# Patient Record
Sex: MALE | Race: WHITE | NOT HISPANIC OR LATINO | URBAN - METROPOLITAN AREA
[De-identification: names, ages, dates, MRNs, and addresses within clinical notes are randomized per-mention and may not be internally consistent; named-entity substitution may affect disease eponyms.]

---

## 2023-10-23 ENCOUNTER — CLAIMS CREATED FROM THE CLAIM WINDOW (OUTPATIENT)
Dept: URBAN - METROPOLITAN AREA CLINIC 98 | Facility: CLINIC | Age: 68
End: 2023-10-23
Payer: MEDICARE

## 2023-10-23 ENCOUNTER — DASHBOARD ENCOUNTERS (OUTPATIENT)
Age: 68
End: 2023-10-23

## 2023-10-23 VITALS
HEART RATE: 81 BPM | HEIGHT: 74 IN | DIASTOLIC BLOOD PRESSURE: 69 MMHG | TEMPERATURE: 98.1 F | BODY MASS INDEX: 40.43 KG/M2 | SYSTOLIC BLOOD PRESSURE: 117 MMHG | WEIGHT: 315 LBS

## 2023-10-23 DIAGNOSIS — K21.9 GASTROESOPHAGEAL REFLUX DISEASE, UNSPECIFIED WHETHER ESOPHAGITIS PRESENT: ICD-10-CM

## 2023-10-23 DIAGNOSIS — Z12.11 SCREEN FOR COLON CANCER: ICD-10-CM

## 2023-10-23 PROBLEM — 235595009: Status: ACTIVE | Noted: 2023-10-23

## 2023-10-23 PROCEDURE — 99204 OFFICE O/P NEW MOD 45 MIN: CPT

## 2023-10-23 RX ORDER — OMEPRAZOLE 40 MG/1
1 CAPSULE 30 MINUTES BEFORE MORNING MEAL CAPSULE, DELAYED RELEASE ORAL ONCE A DAY
Qty: 90 | Refills: 3 | OUTPATIENT
Start: 2023-10-23

## 2023-10-23 RX ORDER — LEDIPASVIR AND SOFOSBUVIR 90; 400 MG/1; MG/1
TAKE ONE TABLET BY MOUTH DAILY TABLET, FILM COATED ORAL
Qty: 28 | Refills: 0 | Status: ON HOLD | COMMUNITY
Start: 2015-02-02

## 2023-10-23 NOTE — HPI-TODAY'S VISIT:
Mr. Patel is a 68 year old male here to discuss screening colonoscopy and reflux. PCP Dr. Kera Sanchez, report will be sent to office following visit. - Last colonoscopy 10/2014; without colon polyps; repeat screening 10 years per Dr. Schwarz - Followed by pulmonologist every 3-4 months COPD - cardiologist every 6 months; cardiomegaly will need cardiac clearance  - BM 1-2 per day; formed - No bright red rectal bleeding, melena, or mucus - No abdominal pain - No unintentional weight loss - No change in bowel habits - Noticed irregular heartburn and reflux with regurgiation; associated with specific dietary items - No nausea, vomiting, epigastric pain

## 2023-10-26 ENCOUNTER — OFFICE VISIT (OUTPATIENT)
Dept: URBAN - METROPOLITAN AREA CLINIC 98 | Facility: CLINIC | Age: 68
End: 2023-10-26

## 2023-11-30 ENCOUNTER — OFFICE VISIT (OUTPATIENT)
Dept: URBAN - METROPOLITAN AREA CLINIC 98 | Facility: CLINIC | Age: 68
End: 2023-11-30